# Patient Record
Sex: MALE | Race: WHITE | HISPANIC OR LATINO | Employment: FULL TIME
[De-identification: names, ages, dates, MRNs, and addresses within clinical notes are randomized per-mention and may not be internally consistent; named-entity substitution may affect disease eponyms.]

---
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
## 2022-07-17 ENCOUNTER — HOSPITAL ENCOUNTER (EMERGENCY)
Facility: HOSPITAL | Age: 22
Discharge: HOME | End: 2022-07-17
Attending: EMERGENCY MEDICINE | Admitting: EMERGENCY MEDICINE

## 2022-07-17 VITALS
TEMPERATURE: 98.3 F | HEART RATE: 84 BPM | SYSTOLIC BLOOD PRESSURE: 134 MMHG | HEIGHT: 72 IN | WEIGHT: 155 LBS | BODY MASS INDEX: 20.99 KG/M2 | OXYGEN SATURATION: 99 % | DIASTOLIC BLOOD PRESSURE: 66 MMHG | RESPIRATION RATE: 18 BRPM

## 2022-07-17 DIAGNOSIS — H92.02 OTALGIA, LEFT: ICD-10-CM

## 2022-07-17 PROCEDURE — 250N000013 HC RX MED GY IP 250 OP 250 PS 637: Performed by: EMERGENCY MEDICINE

## 2022-07-17 PROCEDURE — 250N000009 HC RX 250: Performed by: EMERGENCY MEDICINE

## 2022-07-17 PROCEDURE — 99283 EMERGENCY DEPT VISIT LOW MDM: CPT

## 2022-07-17 RX ORDER — LIDOCAINE HYDROCHLORIDE 20 MG/ML
10 JELLY TOPICAL ONCE
Status: COMPLETED | OUTPATIENT
Start: 2022-07-17 | End: 2022-07-17

## 2022-07-17 RX ORDER — ACETAMINOPHEN 325 MG/1
650 TABLET ORAL ONCE
Status: COMPLETED | OUTPATIENT
Start: 2022-07-17 | End: 2022-07-17

## 2022-07-17 RX ORDER — IBUPROFEN 200 MG
400 TABLET ORAL ONCE
Status: COMPLETED | OUTPATIENT
Start: 2022-07-17 | End: 2022-07-17

## 2022-07-17 RX ORDER — AMOXICILLIN 500 MG/1
500 CAPSULE ORAL 3 TIMES DAILY
Qty: 21 CAPSULE | Refills: 0 | Status: SHIPPED | OUTPATIENT
Start: 2022-07-17 | End: 2022-07-24

## 2022-07-17 RX ADMIN — LIDOCAINE HYDROCHLORIDE 10 ML: 20 JELLY TOPICAL at 19:48

## 2022-07-17 RX ADMIN — IBUPROFEN 400 MG: 200 TABLET, FILM COATED ORAL at 19:47

## 2022-07-17 RX ADMIN — ACETAMINOPHEN 650 MG: 325 TABLET ORAL at 19:47

## 2022-07-17 NOTE — ED TRIAGE NOTES
Presents to triage with girlfriend for c/o left ear pain.  Went swimming today.         Triage Assessment     Row Name 07/17/22 4858       Triage Assessment (Adult)    Airway WDL WDL       Respiratory WDL    Respiratory WDL WDL       Skin Circulation/Temperature WDL    Skin Circulation/Temperature WDL WDL       Cardiac WDL    Cardiac WDL WDL       Peripheral/Neurovascular WDL    Peripheral Neurovascular WDL WDL       Cognitive/Neuro/Behavioral WDL    Cognitive/Neuro/Behavioral WDL WDL

## 2022-07-18 NOTE — DISCHARGE INSTRUCTIONS
Take ibuprofen 600 mg every 8 hours and Tylenol 1 g every 8 hours for pain management.  Use lidocaine topically once if recurrent pain tonight.  If you have persistent pain tomorrow start the amoxicillin.  Follow-up with the primary care clinic through the [facility] referral system in 3 days for reevaluation.  Return to the emergency department if you have uncontrolled pain or new symptoms ear drainage or fever.

## 2022-07-18 NOTE — ED PROVIDER NOTES
EMERGENCY DEPARTMENT NOTE     Name: Harvey Horn    Age/Sex: 22 year old male   MRN: [id number]   Evaluation Date & Time:  7/17/2022  6:57 PM    PCP:    Radha Lopez   ED Provider: Tayo Koo D.O.       CHIEF COMPLAINT    Otalgia       DIAGNOSIS & DISPOSITION     1. Otalgia, left      DISPOSITION: Home    At the conclusion of the encounter I discussed the results of all of the tests and the disposition. The questions were answered. The patient or family acknowledged understanding and was agreeable with the care plan.    TOTAL CRITICAL CARE TIME (EXCLUDING PROCEDURES): Not applicable    PROCEDURES:   None    EMERGENCY DEPARTMENT COURSE/MEDICAL DECISION MAKING   7:05 PM I met with the patient to gather history and to perform my initial exam.  We discussed treatment options and the plan for care while in the Emergency Department.    Harvey Horn is a 22 year old male with no pertinent medical history who presents to the emergency department for evaluation of ear pain.     Triage note reviewed:  Presents to triage with girlfriend for c/o left ear pain.  Went swimming today.         Triage Assessment     Row Name 07/17/22 5359       Triage Assessment (Adult)    Airway WDL WDL       Respiratory WDL    Respiratory WDL WDL       Skin Circulation/Temperature WDL    Skin Circulation/Temperature WDL WDL       Cardiac WDL    Cardiac WDL WDL       Peripheral/Neurovascular WDL    Peripheral Neurovascular WDL WDL       Cognitive/Neuro/Behavioral WDL    Cognitive/Neuro/Behavioral WDL WDL                Vital signs:/66   Pulse 84   Temp 98.3  F (36.8  C) (Oral)   Resp 18   Ht 1.829 m (6')   Wt 70.3 kg (155 lb)   SpO2 99%   BMI 21.02 kg/m    Pertinent physical exam findings:  HEENT: Left external canal appears normal.  Left TM is erythematous but intact.  Does not appear to have effusion behind the eardrum.  Right TM appears normal  Diagnostic studies:  Imaging:  No orders to display       Lab:  Labs Ordered and Resulted from Time of ED Arrival to Time of ED Departure - No data to display   Interventions: Topical lidocaine, acetaminophen, ibuprofen  Medical decision making:  Patient will be discharged.  Continue Tylenol ibuprofen for recurrent pain.  He was given a small amount of 2% lidocaine to use topically for recurrent pain tonight.  The TM did appear erythematous.  I suspect this is from the hydrogen peroxide that the bystander had instilled to try and clear the water out of his ear.  He does not appear to have effusion behind the eardrum cannot exclude a antecedent otitis media.  The eardrum appears to be intact.  If the patient has continued pain and tomorrow I did give him prescription of amoxicillin to start 3 times daily.  Patient is given a primary care referral to follow-up early next week through the [facility] system.  Return criteria are discussed and if the patient has recurrent ear pain that is uncontrolled, develops a fever or has drainage from the ear will return to the emergency department.    ED INTERVENTIONS     Medications   acetaminophen (TYLENOL) tablet 650 mg (650 mg Oral Given 7/17/22 1947)   ibuprofen (ADVIL/MOTRIN) tablet 400 mg (400 mg Oral Given 7/17/22 1947)   lidocaine (XYLOCAINE) 2 % external gel 10 mL (10 mLs Urethral Given 7/17/22 1948)       DISCHARGE MEDICATIONS        Review of your medicines      UNREVIEWED medicines. Ask your doctor about these medicines      Dose / Directions   Polytrim 35182-9.1 UNIT/ML-% ophthalmic solution  Generic drug: trimethoprim-polymyxin b      2 drops to affected eye QID for 1 week  Quantity: 1 week supply  Refills: 0        START taking      Dose / Directions   amoxicillin 500 MG capsule  Commonly known as: AMOXIL      Dose: 500 mg  Take 1 capsule (500 mg) by mouth 3 times daily for 7 days  Quantity: 21 capsule  Refills: 0           Where to get your medicines      Some of these will need a paper prescription and others can be  "bought over the counter. Ask your nurse if you have questions.    Bring a paper prescription for each of these medications    amoxicillin 500 MG capsule           INFORMATION SOURCE AND LIMITATIONS    History/Exam limitations: none   Patient information was obtained from: patient  Use of : N/A     HISTORY OF PRESENT ILLNESS   Harvey Horn is a 22 year old male with no pertinent medical history who presents to the emergency department for evaluation of ear pain.     Patient reports that he feels like he has water in his left ear that \"hurts like crazy\". Patient submerged his head in a pool earlier today and felt like he got water stuck in his left ear. He had someone put hydrogen peroxide in his ear, but that did not resolve his symptoms. He comes into the ED for worsening pain. Patient denies diving into the pool. Denies recent illness, cold, sore throat, fever, or any other complaints at this time.     REVIEW OF SYSTEMS:   Constitutional: Negative for  fever.   HENT: Positive for left ear pain. Negative for URI symptoms or sore throat.    Cardiac: Negative for  chest pain,palpitations, near syncope or syncope  Respiratory: Negative for cough and shortness of breath.    Gastrointestinal: Negative for abdominal pain, nausea, vomiting, constipation, diarrhea, rectal bleeding or melena.  Genitourinary: Negative for dysuria, flank pain and hematuria.   Musculoskeletal: Negative for back pain.   Skin: Negative for  rash  Neurological: Negative for dizziness, headache, syncope, speech difficulty, unilateral weakness or imbalance with walking.   Hematological: Negative for adenopathy. Does not bruise/bleed easily.   Psychiatric/Behavioral: Negative for confusion.     PATIENT HISTORY     Past Medical History:   Diagnosis Date     Unspecified dental caries      There is no problem list on file for this patient.    History reviewed. No pertinent surgical history.  Social Histrory  Smoking:  Alcohol " Use:  Allergies   Allergen Reactions     No Known Allergies          OUTPATIENT MEDICATIONS     Discharge Medication List as of 7/17/2022  9:07 PM      START taking these medications    Details   amoxicillin (AMOXIL) 500 MG capsule Take 1 capsule (500 mg) by mouth 3 times daily for 7 days, Disp-21 capsule, R-0, Local Print            Vitals:    07/17/22 1854 07/17/22 2000 07/17/22 2116   BP: (!) 145/100 (!) 138/90 134/66   Pulse: 85 88 84   Resp: 18 18 18   Temp: 98.5  F (36.9  C)  98.3  F (36.8  C)   TempSrc: Oral  Oral   SpO2: 97% 98% 99%   Weight: 70.3 kg (155 lb)     Height: 1.829 m (6')         Physical Exam   Constitutional: Oriented to person, place, and time. Appears well-developed and well-nourished.   HEENT: Left ear redness. TM not perforated. Right ear normal.    Head: Atraumatic.   Neck: Normal range of motion. Neck supple.   Cardiovascular: Normal rate, regular rhythm and normal heart sounds.    Pulmonary/Chest: Normal effort  and breath sounds normal.   Abdominal: Soft. Bowel sounds are normal.   Musculoskeletal: Normal range of motion.   Neurological: Alert and oriented to person, place, and time. Normal strength.No sensory deficit. No cranial nerve deficit . Skin: Skin is warm and dry.   Psychiatric: Normal mood and affect. Behavior is normal. Thought content normal.       DIAGNOSTICS    LABORATORY FINDINGS (REVIEWED AND INTERPRETED):  Labs Ordered and Resulted from Time of ED Arrival to Time of ED Departure - No data to display      IMAGING (REVIEWED AND INTERPRETED):  No orders to display         Clarence SMITH, am serving as a scribe to document services personally performed by Tayo Koo D.O., based on my observation and the provider s statements to me.    Tayo SMITH D.O., attest that Clarence Montano is acting in a scribe capacity, has observed my performance of the services and has documented them in accordance with my direction.    Tayo Koo D.O.  EMERGENCY MEDICINE    07/17/22  [facility] EMERGENCY DEPARTMENT  [facility] [facility] [id number]  [phone]  Dept: [phone]       Tayo Koo DO  07/18/22 0025